# Patient Record
Sex: FEMALE | Race: WHITE | HISPANIC OR LATINO | Employment: UNEMPLOYED | URBAN - METROPOLITAN AREA
[De-identification: names, ages, dates, MRNs, and addresses within clinical notes are randomized per-mention and may not be internally consistent; named-entity substitution may affect disease eponyms.]

---

## 2021-07-29 ENCOUNTER — HOSPITAL ENCOUNTER (EMERGENCY)
Facility: HOSPITAL | Age: 31
Discharge: HOME | End: 2021-07-30
Attending: EMERGENCY MEDICINE | Admitting: EMERGENCY MEDICINE

## 2021-07-29 DIAGNOSIS — E11.621 DIABETIC FOOT ULCER (HCC): Primary | ICD-10-CM

## 2021-07-29 DIAGNOSIS — L97.509 DIABETIC FOOT ULCER (HCC): Primary | ICD-10-CM

## 2021-07-29 PROCEDURE — 99284 EMERGENCY DEPT VISIT MOD MDM: CPT

## 2021-07-30 ENCOUNTER — APPOINTMENT (EMERGENCY)
Dept: RADIOLOGY | Facility: HOSPITAL | Age: 31
End: 2021-07-30

## 2021-07-30 VITALS
WEIGHT: 198.63 LBS | DIASTOLIC BLOOD PRESSURE: 79 MMHG | OXYGEN SATURATION: 100 % | SYSTOLIC BLOOD PRESSURE: 175 MMHG | HEART RATE: 91 BPM | RESPIRATION RATE: 20 BRPM | TEMPERATURE: 98.6 F

## 2021-07-30 LAB
ALBUMIN SERPL BCP-MCNC: 3.4 G/DL (ref 3.5–5)
ALP SERPL-CCNC: 55 U/L (ref 46–116)
ALT SERPL W P-5'-P-CCNC: 21 U/L (ref 12–78)
ANION GAP SERPL CALCULATED.3IONS-SCNC: 7 MMOL/L (ref 4–13)
AST SERPL W P-5'-P-CCNC: 17 U/L (ref 5–45)
BASOPHILS # BLD AUTO: 0.11 THOUSANDS/ΜL (ref 0–0.1)
BASOPHILS NFR BLD AUTO: 1 % (ref 0–1)
BILIRUB SERPL-MCNC: 0.13 MG/DL (ref 0.2–1)
BUN SERPL-MCNC: 35 MG/DL (ref 5–25)
CALCIUM ALBUM COR SERPL-MCNC: 9.1 MG/DL (ref 8.3–10.1)
CALCIUM SERPL-MCNC: 8.6 MG/DL (ref 8.3–10.1)
CHLORIDE SERPL-SCNC: 99 MMOL/L (ref 100–108)
CO2 SERPL-SCNC: 27 MMOL/L (ref 21–32)
CREAT SERPL-MCNC: 1.26 MG/DL (ref 0.6–1.3)
EOSINOPHIL # BLD AUTO: 0.26 THOUSAND/ΜL (ref 0–0.61)
EOSINOPHIL NFR BLD AUTO: 2 % (ref 0–6)
ERYTHROCYTE [DISTWIDTH] IN BLOOD BY AUTOMATED COUNT: 13.7 % (ref 11.6–15.1)
GFR SERPL CREATININE-BSD FRML MDRD: 57 ML/MIN/1.73SQ M
GLUCOSE SERPL-MCNC: 165 MG/DL (ref 65–140)
HCT VFR BLD AUTO: 31.4 % (ref 34.8–46.1)
HGB BLD-MCNC: 10 G/DL (ref 11.5–15.4)
IMM GRANULOCYTES # BLD AUTO: 0.14 THOUSAND/UL (ref 0–0.2)
IMM GRANULOCYTES NFR BLD AUTO: 1 % (ref 0–2)
LYMPHOCYTES # BLD AUTO: 3.56 THOUSANDS/ΜL (ref 0.6–4.47)
LYMPHOCYTES NFR BLD AUTO: 28 % (ref 14–44)
MCH RBC QN AUTO: 27.6 PG (ref 26.8–34.3)
MCHC RBC AUTO-ENTMCNC: 31.8 G/DL (ref 31.4–37.4)
MCV RBC AUTO: 87 FL (ref 82–98)
MONOCYTES # BLD AUTO: 0.97 THOUSAND/ΜL (ref 0.17–1.22)
MONOCYTES NFR BLD AUTO: 8 % (ref 4–12)
NEUTROPHILS # BLD AUTO: 7.9 THOUSANDS/ΜL (ref 1.85–7.62)
NEUTS SEG NFR BLD AUTO: 60 % (ref 43–75)
NRBC BLD AUTO-RTO: 0 /100 WBCS
PLATELET # BLD AUTO: 269 THOUSANDS/UL (ref 149–390)
PMV BLD AUTO: 10.1 FL (ref 8.9–12.7)
POTASSIUM SERPL-SCNC: 5 MMOL/L (ref 3.5–5.3)
PROT SERPL-MCNC: 7.6 G/DL (ref 6.4–8.2)
RBC # BLD AUTO: 3.62 MILLION/UL (ref 3.81–5.12)
SODIUM SERPL-SCNC: 133 MMOL/L (ref 136–145)
WBC # BLD AUTO: 12.94 THOUSAND/UL (ref 4.31–10.16)

## 2021-07-30 PROCEDURE — 85025 COMPLETE CBC W/AUTO DIFF WBC: CPT

## 2021-07-30 PROCEDURE — 73660 X-RAY EXAM OF TOE(S): CPT

## 2021-07-30 PROCEDURE — 36415 COLL VENOUS BLD VENIPUNCTURE: CPT

## 2021-07-30 PROCEDURE — 99285 EMERGENCY DEPT VISIT HI MDM: CPT | Performed by: EMERGENCY MEDICINE

## 2021-07-30 PROCEDURE — 80053 COMPREHEN METABOLIC PANEL: CPT

## 2021-07-30 RX ORDER — SULFAMETHOXAZOLE AND TRIMETHOPRIM 800; 160 MG/1; MG/1
1 TABLET ORAL ONCE
Status: COMPLETED | OUTPATIENT
Start: 2021-07-30 | End: 2021-07-30

## 2021-07-30 RX ORDER — CEPHALEXIN 500 MG/1
500 CAPSULE ORAL EVERY 6 HOURS SCHEDULED
Qty: 40 CAPSULE | Refills: 0 | Status: SHIPPED | OUTPATIENT
Start: 2021-07-30 | End: 2021-08-09

## 2021-07-30 RX ORDER — SULFAMETHOXAZOLE AND TRIMETHOPRIM 800; 160 MG/1; MG/1
1 TABLET ORAL EVERY 12 HOURS SCHEDULED
Qty: 20 TABLET | Refills: 0 | Status: SHIPPED | OUTPATIENT
Start: 2021-07-30 | End: 2021-08-09

## 2021-07-30 RX ORDER — CEPHALEXIN 250 MG/1
500 CAPSULE ORAL ONCE
Status: COMPLETED | OUTPATIENT
Start: 2021-07-30 | End: 2021-07-30

## 2021-07-30 RX ADMIN — SULFAMETHOXAZOLE AND TRIMETHOPRIM 1 TABLET: 800; 160 TABLET ORAL at 02:30

## 2021-07-30 RX ADMIN — CEPHALEXIN 500 MG: 250 CAPSULE ORAL at 02:30

## 2021-07-30 NOTE — ED PROVIDER NOTES
History  Chief Complaint   Patient presents with    Toe Pain     had blister on toe a week ago that popped, last night checked on toe and seems infected     This is a 69-year-old female with medical history positive for diabetes mellitus and intellectual disability who presents to the emergency department evaluation to wound  Patient was brought in by her brother who is her current caretaker and through this history was obtained as patient doesn't speak english and, per her brother, has the intellectual capacity of a 9y  Patient caregiver noticed a blister patient's right foot approximately 2 weeks ago  Patient is commonly resistant to medical evaluation/treatment  When patient caregiver asked patient about wound on foot patient said it was nothing and did not present for evaluation  Yesterday patient caregiver noticed that the blister had increased in size that the surrounding tissue is erythematous  Patient was in today for evaluation  To obtain review of systems because patient does not speak patient caregiver regarding health concerns and patient caregiver states he does not know of any medical complaints pt has because of this  None       Past Medical History:   Diagnosis Date    Diabetes mellitus ([facility] [address] )        History reviewed  No pertinent surgical history  History reviewed  No pertinent family history  I have reviewed and agree with the history as documented      E-Cigarette/Vaping     E-Cigarette/Vaping Substances     Social History     Tobacco Use    Smoking status: Not on file   Substance Use Topics    Alcohol use: Not on file    Drug use: Not on file        Review of Systems   Unable to perform ROS: Psychiatric disorder       Physical Exam  ED Triage Vitals   Temperature Pulse Respirations Blood Pressure SpO2   07/29/21 2358 07/29/21 2358 07/29/21 2358 07/29/21 2358 07/29/21 2358   98 6 °F (37 °C) 91 20 137/63 100 %      Temp Source Heart Rate Source Patient Position - Orthostatic VS BP Location FiO2 (%)   07/29/21 2358 07/29/21 2358 07/29/21 2358 07/29/21 2358 --   Oral Monitor Sitting Right arm       Pain Score       07/30/21 0221       No Pain             Orthostatic Vital Signs  Vitals:    07/29/21 2358 07/30/21 0221   BP: 137/63 (!) 175/79   Pulse: 91 91   Patient Position - Orthostatic VS: Sitting Lying       Physical Exam  Vitals and nursing note reviewed  Constitutional:       General: She is not in acute distress  Appearance: She is obese  She is not ill-appearing, toxic-appearing or diaphoretic  HENT:      Head: Normocephalic and atraumatic  Mouth/Throat:      Mouth: Mucous membranes are moist    Eyes:      Extraocular Movements: Extraocular movements intact  Pupils: Pupils are equal, round, and reactive to light  Cardiovascular:      Rate and Rhythm: Normal rate  Pulses: Normal pulses  Heart sounds: Normal heart sounds  Pulmonary:      Effort: Pulmonary effort is normal  No respiratory distress  Breath sounds: Normal breath sounds  Abdominal:      General: Bowel sounds are normal       Tenderness: There is no abdominal tenderness  There is no guarding  Musculoskeletal:         General: Normal range of motion  Cervical back: Normal range of motion  Right lower leg: Edema present  Left lower leg: Edema present  Skin:     General: Skin is warm  Capillary Refill: Capillary refill takes less than 2 seconds  Neurological:      General: No focal deficit present  Mental Status: She is alert  Sensory: Sensory deficit present        Comments: Intellectual disability         ED Medications  Medications   cephalexin (KEFLEX) capsule 500 mg (500 mg Oral Given 7/30/21 0230)   sulfamethoxazole-trimethoprim (BACTRIM DS) 800-160 mg per tablet 1 tablet (1 tablet Oral Given 7/30/21 0230)       Diagnostic Studies  Results Reviewed     Procedure Component Value Units Date/Time    Comprehensive metabolic panel [[id number]] (Abnormal) Collected: 07/30/21 0126    Lab Status: Final result Specimen: Blood from Arm, Left Updated: 07/30/21 0154     Sodium 133 mmol/L      Potassium 5 0 mmol/L      Chloride 99 mmol/L      CO2 27 mmol/L      ANION GAP 7 mmol/L      BUN 35 mg/dL      Creatinine 1 26 mg/dL      Glucose 165 mg/dL      Calcium 8 6 mg/dL      Corrected Calcium 9 1 mg/dL      AST 17 U/L      ALT 21 U/L      Alkaline Phosphatase 55 U/L      Total Protein 7 6 g/dL      Albumin 3 4 g/dL      Total Bilirubin 0 13 mg/dL      eGFR 57 ml/min/1 73sq m     Narrative:      [place] guidelines for Chronic Kidney Disease (CKD):     Stage 1 with normal or high GFR (GFR > 90 mL/min/1 73 square meters)    Stage 2 Mild CKD (GFR = 60-89 mL/min/1 73 square meters)    Stage 3A Moderate CKD (GFR = 45-59 mL/min/1 73 square meters)    Stage 3B Moderate CKD (GFR = 30-44 mL/min/1 73 square meters)    Stage 4 Severe CKD (GFR = 15-29 mL/min/1 73 square meters)    Stage 5 End Stage CKD (GFR <15 mL/min/1 73 square meters)  Note: GFR calculation is accurate only with a steady state creatinine    CBC and differential [[id number]]  (Abnormal) Collected: 07/30/21 0126    Lab Status: Final result Specimen: Blood from Arm, Left Updated: 07/30/21 0139     WBC 12 94 Thousand/uL      RBC 3 62 Million/uL      Hemoglobin 10 0 g/dL      Hematocrit 31 4 %      MCV 87 fL      MCH 27 6 pg      MCHC 31 8 g/dL      RDW 13 7 %      MPV 10 1 fL      Platelets 339 Thousands/uL      nRBC 0 /100 WBCs      Neutrophils Relative 60 %      Immat GRANS % 1 %      Lymphocytes Relative 28 %      Monocytes Relative 8 %      Eosinophils Relative 2 %      Basophils Relative 1 %      Neutrophils Absolute 7 90 Thousands/µL      Immature Grans Absolute 0 14 Thousand/uL      Lymphocytes Absolute 3 56 Thousands/µL      Monocytes Absolute 0 97 Thousand/µL      Eosinophils Absolute 0 26 Thousand/µL      Basophils Absolute 0 11 Thousands/µL                  XR toe great min 2 view RIGHT   ED Interpretation by Rj Robertson DO (07/30 0144)   MTP, PIP, DIP intact, no erosions noted            Procedures  Procedures      ED Course  ED Course as of Jul 30 0256 Fri Jul 30, 2021 0144 Xray R great toe nl                                SBIRT 22yo+      Most Recent Value   SBIRT (25 yo +)   In order to provide better care to our patients, we are screening all of our patients for alcohol and drug use  Would it be okay to ask you these screening questions? Unable to answer at this time Filed at: 07/29/2021 7124                MDM  Number of Diagnoses or Management Options  Diabetic foot ulcer ([facility] ): new and does not require workup  Diagnosis management comments: Diabetic foot ulcer  -pt presented to the emergency department for evaluation of foot ulcer  -patient was brought in by her caregiver, her brother, for evaluation  -unable to obtain review of systems because patient intellectual disability  -x-ray of right foot unremarkable, osteomyelitis unlikely  Lab work not concerning for disseminated infection  -joint decision making w/ pt caregiver lead to disposition of d/c home w/ antibiotics  Return precautions given         Amount and/or Complexity of Data Reviewed  Clinical lab tests: ordered and reviewed  Tests in the radiology section of CPT®: ordered and reviewed  Tests in the medicine section of CPT®: ordered and reviewed  Obtain history from someone other than the patient: yes  Independent visualization of images, tracings, or specimens: yes        Disposition  Final diagnoses:   Diabetic foot ulcer ([facility] )     Time reflects when diagnosis was documented in both MDM as applicable and the Disposition within this note     Time User Action Codes Description Comment    7/30/2021  2:28 AM Scottie Murray Add [U37 [id number],  L99 113] Diabetic foot ulcer [facility])       ED Disposition     ED Disposition Condition Date/Time Comment    Discharge Stable Fri Jul 30, 2021  2:28 AM [address] Friedman discharge to home/self care  Follow-up Information    None         Discharge Medication List as of 7/30/2021  2:33 AM      START taking these medications    Details   cephalexin (KEFLEX) 500 mg capsule Take 1 capsule (500 mg total) by mouth every 6 (six) hours for 10 days, Starting Fri 7/30/2021, Until Mon 8/9/2021, Print      sulfamethoxazole-trimethoprim (BACTRIM DS) 800-160 mg per tablet Take 1 tablet by mouth every 12 (twelve) hours for 10 days smx-tmp DS (BACTRIM) 800-160 mg tabs (1tab q12 D10), Starting Fri 7/30/2021, Until Mon 8/9/2021, Print           No discharge procedures on file  PDMP Review     None           ED Provider  Attending physically available and evaluated [address] Friedman I managed the patient along with the ED Attending      Electronically Signed by         Sharon Hamm DO  07/30/21 7089

## 2021-07-30 NOTE — ED ATTENDING ATTESTATION
7/29/2021  I, Hui Del Valle MD, saw and evaluated the patient  I have discussed the patient with the resident/non-physician practitioner and agree with the resident's/non-physician practitioner's findings, Plan of Care, and MDM as documented in the resident's/non-physician practitioner's note, except where noted  All available labs and Radiology studies were reviewed  I was present for key portions of any procedure(s) performed by the resident/non-physician practitioner and I was immediately available to provide assistance  At this point I agree with the current assessment done in the Emergency Department  I have conducted an independent evaluation of this patient a history and physical is as follows:    ED Course         Critical Care Time  Procedures    27 yof with history of diabetes with a blister to the toe  Brother is her caregiver  Blister was noticed yesterday  Right big toe  No f/c/s  Patient will not give a good ros  Patient with intellectual disability  Ever so mild erythema surrounding the blister  Suspect simple erythema from the blister vs very mild cellulitis  Patient is obese  MDM pleasant 27 yof, mild infected diabetic foot wound vs non-infected, given mild nature, okay for dc home but with strict return precautions  Discussed the importance of returning should the infection progress - discussed this with the caregiver